# Patient Record
(demographics unavailable — no encounter records)

---

## 2025-02-21 NOTE — PROCEDURE
[FreeTextEntry1] : CGM interpretation: Device: DEXCOM Period: Feb 8th 2025 to Feb 21st 2025 Findings:  Percent in range: 68 %, Percent low/very low BGs:1 %, Percent high/very high BGs: 31% Interpretation - Patient has hyperglycemia

## 2025-02-21 NOTE — PHYSICAL EXAM
[de-identified] : General: No distress, well nourished Eyes: Normal Sclera, EOMI, PERRL ENT: Normal appearance of the nose, normal oropharynx Neck/Thyroid: No cervical lymphadenopathy, thyroid gland 20 g in size, no thyroid nodules, non-tender Respiratory: No use of accessory muscles of respiration, vesicular breath sounds heard bilaterally, no crepitations or ronchi Cardiovascular: S1 and S2 heard and normal, no S3 or S4, no murmurs, radial pulse normal bilaterally Abdomen: soft, non-tender, no masses, normal bowel sounds Musculoskeletal: No swelling or deformities of joints of hands, no pedal edema Neurological: Normal range of motion in the hands, Normal brachioradialis reflexes bilaterally Psychiatry: Patient converses normally, good judgement and insight Skin: No rashes in hands, no nodules palpated in hands  [de-identified] :  DM foot exam done on 02/21/2025: No ulcers seen in feet, has calluses on feet Dorsalis pedis pulses normal bilaterally Sensation to 10 g monofilament normal in feet bilaterally.

## 2025-02-21 NOTE — ASSESSMENT
[FreeTextEntry1] : Target: HbA1c < 7%, BP < 130/80  HbA1c is above goal so I increased the dose of Ozempic. He has hypoglycemia at nighttime so I decreased the dose of insulin lantus to 18 units sc qhs. BP is mildly above goal but I will monitor this for now.  Patient is on ARB - no indication for Aspirin - patient does not want to use statin.  Last lipid panel - Feb 2025 - , Trig 221 Last HbA1c - 02/17/2025 - 8.2% Last Vitamin B12 - Feb 2025 - N Last urine albumin panel - Feb 2025  - positive Last BMP/CMP - Feb 2025  - Cr, K, AST, ALT N  CGM INTERPRETATION DONE ON 02/21/2025  Plan: 1. Increase Ozempic to 2 mg sc once weekly  2. Decrease insulin lantus pens to 18 units sc qhs  3. Labs to be done in 3 months - see below 4. CGM DEXCOM 6 - patient uses the reader - his insurance does not pay for the DEXCOM 7 5. Follow up in 3 months to review meter and results

## 2025-02-21 NOTE — HISTORY OF PRESENT ILLNESS
[FreeTextEntry1] : Problems: 1. DM type 2 2. Hypertension 3. DM nephropathy (normal Cr, positive urine microalbumin panel in June 2023)  DM type 2 1. Diagnosed in 2021 2. Meds: Insulin lantus pens 22 units sc qhs  Metformin 500 mg po bid (no side effects - patient had unknown side effects with higher doses) Ozempic 1 mg sc once weekly - no side effects (no pancreatitis, no personal or family history of medullary thyroid cancer) Glipizide ER 10 mg po daily No SGLT-2 inhibitors - Patient had UTIs in the past and he felt dehydrated with Farxiga Patient used glyburide in the past with no complaints - this was discontinued due to hyperglycemia.  3. CGM DEXCOM 6 - patient uses the reader - 60s to 300s, no hypoglycemic unawareness 4. Not on Aspirin, not on statin - patient does not want to use statin at this time, on benicar (olmesartan) 40 mg po daily 5. Complications: Has DM nephropathy (normal creatinine, positive urine microalbumin panel in Feb 2025) No DM retinopathy (last eye exam was in Feb 2025, patient advised on the need for annual DM eye exam) No ASCVD No foot ulcers/amputation 6. Patient never smoked cigarettes but smoked cigars in the past - last used cigars in 2019